# Patient Record
Sex: MALE | Race: BLACK OR AFRICAN AMERICAN | NOT HISPANIC OR LATINO | Employment: PART TIME
[De-identification: names, ages, dates, MRNs, and addresses within clinical notes are randomized per-mention and may not be internally consistent; named-entity substitution may affect disease eponyms.]

---

## 2021-01-04 ENCOUNTER — HEALTH MAINTENANCE LETTER (OUTPATIENT)
Age: 26
End: 2021-01-04

## 2021-10-10 ENCOUNTER — HEALTH MAINTENANCE LETTER (OUTPATIENT)
Age: 26
End: 2021-10-10

## 2022-01-30 ENCOUNTER — HEALTH MAINTENANCE LETTER (OUTPATIENT)
Age: 27
End: 2022-01-30

## 2022-09-24 ENCOUNTER — HEALTH MAINTENANCE LETTER (OUTPATIENT)
Age: 27
End: 2022-09-24

## 2022-12-19 ENCOUNTER — LAB (OUTPATIENT)
Dept: LAB | Facility: CLINIC | Age: 27
End: 2022-12-19
Payer: COMMERCIAL

## 2022-12-19 ENCOUNTER — OFFICE VISIT (OUTPATIENT)
Dept: INTERNAL MEDICINE | Facility: CLINIC | Age: 27
End: 2022-12-19
Payer: COMMERCIAL

## 2022-12-19 VITALS
SYSTOLIC BLOOD PRESSURE: 111 MMHG | DIASTOLIC BLOOD PRESSURE: 67 MMHG | WEIGHT: 153.9 LBS | BODY MASS INDEX: 22.8 KG/M2 | HEART RATE: 69 BPM | OXYGEN SATURATION: 97 % | HEIGHT: 69 IN

## 2022-12-19 DIAGNOSIS — Z11.3 ROUTINE SCREENING FOR STI (SEXUALLY TRANSMITTED INFECTION): ICD-10-CM

## 2022-12-19 DIAGNOSIS — Z11.59 NEED FOR HEPATITIS B SCREENING TEST: ICD-10-CM

## 2022-12-19 DIAGNOSIS — K02.9 DENTAL CARIES: ICD-10-CM

## 2022-12-19 DIAGNOSIS — Z11.59 NEED FOR HEPATITIS C SCREENING TEST: Primary | ICD-10-CM

## 2022-12-19 DIAGNOSIS — Z11.59 NEED FOR HEPATITIS C SCREENING TEST: ICD-10-CM

## 2022-12-19 LAB
HBV SURFACE AB SERPL IA-ACNC: 15.33 M[IU]/ML
HBV SURFACE AB SERPL IA-ACNC: REACTIVE M[IU]/ML
HCV AB SERPL QL IA: NONREACTIVE
HIV 1+2 AB+HIV1 P24 AG SERPL QL IA: NONREACTIVE

## 2022-12-19 PROCEDURE — 99385 PREV VISIT NEW AGE 18-39: CPT | Performed by: HOSPITALIST

## 2022-12-19 PROCEDURE — 86780 TREPONEMA PALLIDUM: CPT | Performed by: HOSPITALIST

## 2022-12-19 PROCEDURE — 36415 COLL VENOUS BLD VENIPUNCTURE: CPT | Performed by: PATHOLOGY

## 2022-12-19 PROCEDURE — 86803 HEPATITIS C AB TEST: CPT | Performed by: HOSPITALIST

## 2022-12-19 PROCEDURE — 86704 HEP B CORE ANTIBODY TOTAL: CPT | Performed by: HOSPITALIST

## 2022-12-19 PROCEDURE — 86706 HEP B SURFACE ANTIBODY: CPT | Performed by: HOSPITALIST

## 2022-12-19 PROCEDURE — 87591 N.GONORRHOEAE DNA AMP PROB: CPT | Performed by: HOSPITALIST

## 2022-12-19 PROCEDURE — 87389 HIV-1 AG W/HIV-1&-2 AB AG IA: CPT | Performed by: HOSPITALIST

## 2022-12-19 PROCEDURE — 87340 HEPATITIS B SURFACE AG IA: CPT | Performed by: HOSPITALIST

## 2022-12-19 PROCEDURE — 87491 CHLMYD TRACH DNA AMP PROBE: CPT | Performed by: HOSPITALIST

## 2022-12-19 ASSESSMENT — ENCOUNTER SYMPTOMS
COUGH: 0
FEVER: 0
SORE THROAT: 0
CONSTIPATION: 0
CHILLS: 0
WOUND: 0
DIARRHEA: 0
FREQUENCY: 0
DYSURIA: 0
ARTHRALGIAS: 0
ABDOMINAL PAIN: 0
SHORTNESS OF BREATH: 0

## 2022-12-19 NOTE — PROGRESS NOTES
Assessment/Plan  Problem List Items Addressed This Visit        Digestive    Dental caries     Recommend patient follow up with a dentist.            Other    Routine screening for STI (sexually transmitted infection)     Labs for HIV, Urine GC/Chlamydia, Syphilis.          Relevant Orders    HIV Antigen Antibody Combo    Treponema Abs w Reflex to RPR and Titer    N. gonorrhea PCR - Urine, Lab Collect    C. trachomatis PCR - Urine, Lab Collect    Need for hepatitis B screening test    Relevant Orders    Hepatitis B Surface Antibody    Hepatitis B core antibody    Need for hepatitis C screening test - Primary     Screen for both Hepatitis B and C.         Relevant Orders    HCV Screen with Reflex     NOTE: Will message patient encouraging Influenza and COVID19 boosters if he has not obtained them this season.     No results found for any visits on 12/19/22.    Health Maintenance Due   Topic Date Due     YEARLY PREVENTIVE VISIT  Never done     ADVANCE CARE PLANNING  Never done     HEPATITIS B IMMUNIZATION (1 of 3 - 3-dose series) Never done     HPV IMMUNIZATION (1 - Male 2-dose series) Never done     HIV SCREENING  Never done     HEPATITIS C SCREENING  Never done     DTAP/TDAP/TD IMMUNIZATION (1 - Tdap) Never done     COVID-19 Vaccine (3 - Booster for Pfizer series) 12/01/2021     INFLUENZA VACCINE (1) 09/01/2022         Subjective  Patient mentions he hasn't seen a doctor in about 6 years and here for a general check up. Denies any medical issues. Would like STD screening tests as she had unprotected sex with a partner recently. Unprotected as the condom broke. Denies any problems urinating, rashes, warts. Has been with same partner for the past 6 months. Also notes he had right molar pain a few weeks ago and used a gel which helped.       Review of Systems   Constitutional: Negative for chills and fever.   HENT: Positive for dental problem. Negative for congestion and sore throat.    Respiratory: Negative for cough  "and shortness of breath.    Cardiovascular: Negative for chest pain and peripheral edema.   Gastrointestinal: Negative for abdominal pain, constipation and diarrhea.   Genitourinary: Negative for dysuria and frequency.   Musculoskeletal: Negative for arthralgias.   Skin: Negative for rash and wound.   Psychiatric/Behavioral: Negative for mood changes.       History  History reviewed. No pertinent past medical history.    History reviewed. No pertinent surgical history.    Family History   Problem Relation Age of Onset     Hypertension Father      Diabetes Father      Hepatitis Father         Hepatitis B     Diabetes Paternal Grandmother        Social History     Tobacco Use     Smoking status: Some Days     Packs/day: 1.00     Years: 2.00     Pack years: 2.00     Types: Cigarettes     Smokeless tobacco: Never   Substance Use Topics     Alcohol use: Not Currently        Objective  /67 (BP Location: Right arm, Patient Position: Sitting, Cuff Size: Adult Regular)   Pulse 69   Ht 1.753 m (5' 9\")   Wt 69.8 kg (153 lb 14.4 oz)   SpO2 97%   BMI 22.73 kg/m    Vitals taken by Cuco Adler MD    Physical Exam  Constitutional:       General: He is not in acute distress.     Appearance: Normal appearance. He is normal weight. He is not ill-appearing or toxic-appearing.   HENT:      Head: Normocephalic.      Mouth/Throat:      Mouth: Mucous membranes are moist.   Eyes:      Conjunctiva/sclera: Conjunctivae normal.   Cardiovascular:      Rate and Rhythm: Regular rhythm.      Heart sounds: Normal heart sounds. No murmur heard.    No friction rub. No gallop.   Pulmonary:      Effort: Pulmonary effort is normal. No respiratory distress.      Breath sounds: Normal breath sounds. No wheezing, rhonchi or rales.   Abdominal:      General: Bowel sounds are normal. There is no distension.      Palpations: Abdomen is soft. There is no mass.      Tenderness: There is no abdominal tenderness.      Hernia: No hernia is " present.   Musculoskeletal:      Right lower leg: No edema.      Left lower leg: No edema.   Skin:     General: Skin is warm and dry.   Neurological:      Mental Status: He is alert.   Psychiatric:         Mood and Affect: Mood normal.         Thought Content: Thought content normal.         I spent greater than 50% of the 20 minutes in the visit coordinating care regarding patient's recommendations towards establishing care and preventative visit    Return in about 1 year (around 12/19/2023).      Cuco Adler MD  [facility] [place]

## 2022-12-19 NOTE — PATIENT INSTRUCTIONS
- STD testing: HIV, Urine GC/Chlamydia, Syphilis.   - Hepatitis screen: Hepatitis B and C.  - Would like help in Mychart set up.    Follow up again in 1 year or  as needed.

## 2022-12-19 NOTE — NURSING NOTE
"Azar Lopez is a 27 year old male patient that presents today in clinic for the following:    Chief Complaint   Patient presents with     Establish Care     Physical     Pt reports this is his first check up in several years     Imm/Inj     Pt has not had vaccinations since he was young     The patient's allergies and medications were reviewed as noted. A set of vitals were recorded as noted without incident: /67 (BP Location: Right arm, Patient Position: Sitting, Cuff Size: Adult Regular)   Pulse 69   Ht 1.753 m (5' 9\")   Wt 69.8 kg (153 lb 14.4 oz)   SpO2 97%   BMI 22.73 kg/m  . The patient does not have any other questions for the provider.    Tomas Stevens, Visit Facilitator 12:55 PM on 12/19/2022   "

## 2022-12-20 DIAGNOSIS — Z11.59 NEED FOR HEPATITIS B SCREENING TEST: Primary | ICD-10-CM

## 2022-12-20 LAB
C TRACH DNA SPEC QL NAA+PROBE: NEGATIVE
HBV CORE AB SERPL QL IA: REACTIVE
N GONORRHOEA DNA SPEC QL NAA+PROBE: NEGATIVE
T PALLIDUM AB SER QL: NONREACTIVE

## 2022-12-21 LAB — HBV SURFACE AG SERPL QL IA: NONREACTIVE

## 2023-02-01 ENCOUNTER — NURSE TRIAGE (OUTPATIENT)
Dept: NURSING | Facility: CLINIC | Age: 28
End: 2023-02-01
Payer: COMMERCIAL

## 2023-02-01 NOTE — TELEPHONE ENCOUNTER
Nurse Triage SBAR    Is this a 2nd Level Triage? No    Situation/Background: Patient is calling for follow up after office visit/labs drawn on 12/19/22. Patient states she was supposed to access on MyChart but has not been able to access her MyChart.     Provider has commented on labs. Information was provided to the patient.     Recommendation: Per disposition, Information provided. Patient verbalized understanding and agrees with plan. Patient transferred to scheduling for assistance with MyChart.     Protocol Recommended Disposition: Telephone advice    Fela Garcia RN on 2/1/2023 at 4:58 PM  [facility]

## 2023-02-01 NOTE — TELEPHONE ENCOUNTER
Reason for Disposition    [1] Follow-up call to recent contact AND [2] information only call, no triage required    Protocols used: INFORMATION ONLY CALL - NO TRIAGE-A-

## 2024-02-25 ENCOUNTER — HEALTH MAINTENANCE LETTER (OUTPATIENT)
Age: 29
End: 2024-02-25

## 2025-03-15 ENCOUNTER — HEALTH MAINTENANCE LETTER (OUTPATIENT)
Age: 30
End: 2025-03-15